# Patient Record
Sex: MALE | Race: WHITE | NOT HISPANIC OR LATINO
[De-identification: names, ages, dates, MRNs, and addresses within clinical notes are randomized per-mention and may not be internally consistent; named-entity substitution may affect disease eponyms.]

---

## 2017-10-19 ENCOUNTER — APPOINTMENT (OUTPATIENT)
Dept: UROLOGY | Facility: CLINIC | Age: 67
End: 2017-10-19
Payer: MEDICARE

## 2017-10-19 VITALS
HEART RATE: 97 BPM | SYSTOLIC BLOOD PRESSURE: 124 MMHG | DIASTOLIC BLOOD PRESSURE: 76 MMHG | HEIGHT: 70 IN | WEIGHT: 165 LBS | OXYGEN SATURATION: 98 % | BODY MASS INDEX: 23.62 KG/M2 | TEMPERATURE: 98.5 F

## 2017-10-19 DIAGNOSIS — Z86.39 PERSONAL HISTORY OF OTHER ENDOCRINE, NUTRITIONAL AND METABOLIC DISEASE: ICD-10-CM

## 2017-10-19 DIAGNOSIS — Z86.79 PERSONAL HISTORY OF OTHER DISEASES OF THE CIRCULATORY SYSTEM: ICD-10-CM

## 2017-10-19 DIAGNOSIS — Z87.891 PERSONAL HISTORY OF NICOTINE DEPENDENCE: ICD-10-CM

## 2017-10-19 DIAGNOSIS — Z87.19 PERSONAL HISTORY OF OTHER DISEASES OF THE DIGESTIVE SYSTEM: ICD-10-CM

## 2017-10-19 PROCEDURE — 99213 OFFICE O/P EST LOW 20 MIN: CPT

## 2017-10-19 RX ORDER — METOPROLOL SUCCINATE 50 MG/1
50 TABLET, EXTENDED RELEASE ORAL
Refills: 0 | Status: ACTIVE | COMMUNITY

## 2017-10-19 RX ORDER — TRAZODONE HCL 50 MG
50 TABLET ORAL
Refills: 0 | Status: ACTIVE | COMMUNITY

## 2017-10-19 RX ORDER — ROSUVASTATIN CALCIUM 5 MG/1
5 TABLET, FILM COATED ORAL
Refills: 0 | Status: ACTIVE | COMMUNITY

## 2017-10-19 RX ORDER — GABAPENTIN 100 MG/1
100 CAPSULE ORAL
Refills: 0 | Status: ACTIVE | COMMUNITY

## 2017-10-19 RX ORDER — EZETIMIBE 10 MG/1
10 TABLET ORAL
Refills: 0 | Status: ACTIVE | COMMUNITY

## 2017-10-19 RX ORDER — AMLODIPINE BESYLATE 10 MG/1
10 TABLET ORAL
Refills: 0 | Status: ACTIVE | COMMUNITY

## 2017-10-19 RX ORDER — OMEPRAZOLE MAGNESIUM 40 MG/1
40 CAPSULE, DELAYED RELEASE ORAL
Refills: 0 | Status: ACTIVE | COMMUNITY

## 2017-12-15 ENCOUNTER — APPOINTMENT (OUTPATIENT)
Dept: UROLOGY | Facility: CLINIC | Age: 67
End: 2017-12-15

## 2018-02-16 ENCOUNTER — APPOINTMENT (OUTPATIENT)
Dept: UROLOGY | Facility: CLINIC | Age: 68
End: 2018-02-16
Payer: MEDICARE

## 2018-02-16 VITALS
TEMPERATURE: 99 F | OXYGEN SATURATION: 99 % | HEART RATE: 115 BPM | RESPIRATION RATE: 16 BRPM | SYSTOLIC BLOOD PRESSURE: 142 MMHG | DIASTOLIC BLOOD PRESSURE: 84 MMHG

## 2018-02-16 PROCEDURE — 99213 OFFICE O/P EST LOW 20 MIN: CPT | Mod: 25

## 2018-02-16 PROCEDURE — 96402 CHEMO HORMON ANTINEOPL SQ/IM: CPT

## 2018-02-16 RX ORDER — LEUPROLIDE ACETATE 22.5 MG
22.5 KIT INTRAMUSCULAR
Qty: 1 | Refills: 0 | Status: COMPLETED | OUTPATIENT
Start: 2018-02-16 | End: 2018-02-16

## 2018-02-16 RX ORDER — LEUPROLIDE ACETATE 22.5 MG
22.5 KIT INTRAMUSCULAR
Qty: 1 | Refills: 0 | Status: COMPLETED | OUTPATIENT
Start: 2018-02-16

## 2018-02-16 RX ADMIN — LEUPROLIDE ACETATE 0 MG: KIT at 00:00

## 2018-05-18 ENCOUNTER — APPOINTMENT (OUTPATIENT)
Dept: UROLOGY | Facility: CLINIC | Age: 68
End: 2018-05-18
Payer: MEDICARE

## 2018-05-18 PROCEDURE — 99213 OFFICE O/P EST LOW 20 MIN: CPT | Mod: 25

## 2018-05-18 PROCEDURE — 96402 CHEMO HORMON ANTINEOPL SQ/IM: CPT

## 2018-05-18 RX ORDER — DENOSUMAB 60 MG/ML
60 INJECTION SUBCUTANEOUS
Qty: 1 | Refills: 0 | Status: ACTIVE | COMMUNITY
Start: 2018-05-07

## 2018-05-18 RX ORDER — TRAMADOL HYDROCHLORIDE 50 MG/1
50 TABLET, COATED ORAL
Qty: 40 | Refills: 0 | Status: ACTIVE | COMMUNITY
Start: 2017-12-27

## 2018-05-18 RX ORDER — DULOXETINE HYDROCHLORIDE 20 MG/1
20 CAPSULE, DELAYED RELEASE PELLETS ORAL
Qty: 60 | Refills: 0 | Status: ACTIVE | COMMUNITY
Start: 2017-12-27

## 2018-05-18 RX ORDER — METOPROLOL SUCCINATE 25 MG/1
25 TABLET, EXTENDED RELEASE ORAL
Qty: 90 | Refills: 0 | Status: ACTIVE | COMMUNITY
Start: 2018-05-14

## 2018-05-18 RX ORDER — CALCITRIOL 0.5 UG/1
0.5 CAPSULE, LIQUID FILLED ORAL
Qty: 90 | Refills: 0 | Status: ACTIVE | COMMUNITY
Start: 2017-11-22

## 2018-05-18 RX ORDER — FOLIC ACID 1 MG/1
1 TABLET ORAL
Qty: 90 | Refills: 0 | Status: ACTIVE | COMMUNITY
Start: 2017-12-11

## 2018-05-18 RX ORDER — METOPROLOL SUCCINATE 100 MG/1
100 TABLET, EXTENDED RELEASE ORAL
Qty: 90 | Refills: 0 | Status: ACTIVE | COMMUNITY
Start: 2018-04-26

## 2018-05-18 RX ORDER — LEUPROLIDE ACETATE 30 MG
30 KIT INTRAMUSCULAR
Qty: 1 | Refills: 0 | Status: COMPLETED | OUTPATIENT
Start: 2018-05-18

## 2018-05-18 RX ADMIN — LEUPROLIDE ACETATE 30 MG: KIT at 00:00

## 2018-09-07 ENCOUNTER — APPOINTMENT (OUTPATIENT)
Dept: UROLOGY | Facility: CLINIC | Age: 68
End: 2018-09-07
Payer: MEDICARE

## 2018-09-07 VITALS
HEIGHT: 70 IN | BODY MASS INDEX: 23.62 KG/M2 | SYSTOLIC BLOOD PRESSURE: 147 MMHG | HEART RATE: 101 BPM | TEMPERATURE: 98.8 F | DIASTOLIC BLOOD PRESSURE: 73 MMHG | WEIGHT: 165 LBS

## 2018-09-07 PROCEDURE — 99213 OFFICE O/P EST LOW 20 MIN: CPT | Mod: 25

## 2018-09-07 PROCEDURE — 96402 CHEMO HORMON ANTINEOPL SQ/IM: CPT

## 2018-09-07 RX ORDER — LEUPROLIDE ACETATE 30 MG
30 KIT INTRAMUSCULAR
Qty: 1 | Refills: 0 | Status: ACTIVE | COMMUNITY
Start: 2018-09-07

## 2018-09-07 RX ORDER — LEUPROLIDE ACETATE 30 MG
30 KIT INTRAMUSCULAR
Qty: 1 | Refills: 0 | Status: COMPLETED | OUTPATIENT
Start: 2018-09-07

## 2018-09-07 RX ADMIN — LEUPROLIDE ACETATE 0 MG: KIT at 00:00

## 2018-12-21 ENCOUNTER — APPOINTMENT (OUTPATIENT)
Dept: UROLOGY | Facility: CLINIC | Age: 68
End: 2018-12-21
Payer: MEDICARE

## 2018-12-21 VITALS
WEIGHT: 165 LBS | SYSTOLIC BLOOD PRESSURE: 136 MMHG | DIASTOLIC BLOOD PRESSURE: 76 MMHG | HEART RATE: 99 BPM | BODY MASS INDEX: 23.62 KG/M2 | HEIGHT: 70 IN

## 2018-12-21 PROCEDURE — 99214 OFFICE O/P EST MOD 30 MIN: CPT | Mod: 25

## 2018-12-21 PROCEDURE — 96402 CHEMO HORMON ANTINEOPL SQ/IM: CPT

## 2018-12-21 RX ORDER — AMOXICILLIN AND CLAVULANATE POTASSIUM 500; 125 MG/1; MG/1
500-125 TABLET, FILM COATED ORAL
Qty: 10 | Refills: 0 | Status: DISCONTINUED | COMMUNITY
Start: 2018-09-07 | End: 2018-12-21

## 2018-12-21 RX ORDER — LEUPROLIDE ACETATE 30 MG
30 KIT INTRAMUSCULAR
Qty: 1 | Refills: 0 | Status: COMPLETED | OUTPATIENT
Start: 2018-12-21

## 2018-12-21 RX ADMIN — LEUPROLIDE ACETATE 0 MG: KIT at 00:00

## 2019-01-04 ENCOUNTER — APPOINTMENT (OUTPATIENT)
Dept: UROLOGY | Facility: CLINIC | Age: 69
End: 2019-01-04

## 2019-04-12 ENCOUNTER — APPOINTMENT (OUTPATIENT)
Dept: UROLOGY | Facility: CLINIC | Age: 69
End: 2019-04-12
Payer: MEDICARE

## 2019-04-12 ENCOUNTER — APPOINTMENT (OUTPATIENT)
Dept: UROLOGY | Facility: CLINIC | Age: 69
End: 2019-04-12

## 2019-04-12 VITALS
SYSTOLIC BLOOD PRESSURE: 125 MMHG | HEART RATE: 97 BPM | RESPIRATION RATE: 15 BRPM | WEIGHT: 160 LBS | HEIGHT: 70 IN | BODY MASS INDEX: 22.9 KG/M2 | DIASTOLIC BLOOD PRESSURE: 77 MMHG

## 2019-04-12 PROCEDURE — 99213 OFFICE O/P EST LOW 20 MIN: CPT | Mod: 25

## 2019-04-12 PROCEDURE — 96402 CHEMO HORMON ANTINEOPL SQ/IM: CPT

## 2019-04-12 RX ORDER — LEUPROLIDE ACETATE 30 MG
30 KIT INTRAMUSCULAR
Qty: 1 | Refills: 0 | Status: COMPLETED | OUTPATIENT
Start: 2019-04-12

## 2019-04-12 RX ADMIN — LEUPROLIDE ACETATE 0 MG: KIT at 00:00

## 2019-04-12 NOTE — ASSESSMENT
[FreeTextEntry1] : He will continue the above treatment therapy for his bone health. Lupon 30 mg injection was given on the left side, lot number [id number], expiration date November 28, 2020. He will followup in 4 months.\par \par Ra Connolly MD, FACS\par [facility]\par  of Urology\par \par [address], [address]\[place] [address]\par \par [address] [facility], [address]\[place] [address]\par \par Tel: [phone]\par Fax: [phone]

## 2019-04-12 NOTE — HISTORY OF PRESENT ILLNESS
[FreeTextEntry1] : He is a 68-year-old man who is seen today in follow-up for large kidney stone, nocturia, prostate cancer and Lupron injections. Urinary symptoms are unchanged which is nocturia 2 or 3 times. He is still recovering from hip fracture and replacement. PSA level was undetectable in April 2019. He has continued observation of the large left kidney stone since he does not have any symptoms. There is no hematuria, dysuria or weight loss. He is on vitamin D and calcium and Prolia for osteoporosis. \par Previous note: He started Lupron injection in May 2016. He was treated with Cyberknife radiation therapy for prostate cancer around 2008. Bone scan in 2016 was negative and CT scan showed left renal 2.2 cm stone, right obturator adenopathy up to 1.8 cm, common iliac adenopathy and sclerotic pelvic bone lesions.

## 2019-04-12 NOTE — PHYSICAL EXAM
[General Appearance - Well Developed] : well developed [General Appearance - Well Nourished] : well nourished [Normal Appearance] : normal appearance [Well Groomed] : well groomed [General Appearance - In No Acute Distress] : no acute distress [Abdomen Tenderness] : non-tender [Abdomen Soft] : soft [] : no respiratory distress [Costovertebral Angle Tenderness] : no ~M costovertebral angle tenderness [Respiration, Rhythm And Depth] : normal respiratory rhythm and effort [Exaggerated Use Of Accessory Muscles For Inspiration] : no accessory muscle use

## 2019-04-12 NOTE — LETTER BODY
[Dear  ___] : Dear  [unfilled], [Thank you very much for allowing me to participate in the care of this patient. If you have any questions, please do not hesitate to contact me] : Thank you very much for allowing me to participate in the care of this patient. If you have any questions, please do not hesitate to contact me. [Attached please find my note.] : Attached please find my note. [FreeTextEntry1] : [address] \[place], [address] \par [phone] \par

## 2019-04-19 ENCOUNTER — APPOINTMENT (OUTPATIENT)
Dept: UROLOGY | Facility: CLINIC | Age: 69
End: 2019-04-19

## 2019-08-23 ENCOUNTER — APPOINTMENT (OUTPATIENT)
Dept: UROLOGY | Facility: CLINIC | Age: 69
End: 2019-08-23
Payer: MEDICARE

## 2019-08-23 PROCEDURE — 99213 OFFICE O/P EST LOW 20 MIN: CPT | Mod: 25

## 2019-08-23 PROCEDURE — 96402 CHEMO HORMON ANTINEOPL SQ/IM: CPT

## 2019-08-23 RX ORDER — LEUPROLIDE ACETATE 30 MG
30 KIT INTRAMUSCULAR
Qty: 1 | Refills: 0 | Status: COMPLETED | COMMUNITY
Start: 2019-08-23 | End: 2019-08-23

## 2019-08-23 RX ORDER — LEUPROLIDE ACETATE 30 MG
30 KIT INTRAMUSCULAR
Refills: 0 | Status: COMPLETED | OUTPATIENT
Start: 2019-08-23

## 2019-08-23 RX ADMIN — LEUPROLIDE ACETATE 0 MG: KIT at 00:00

## 2019-08-23 NOTE — ASSESSMENT
[FreeTextEntry1] : Lupron 30 mg injection was given on the right side, expiration date September 26, 2021, lot number [id number]. He tolerated it well. Continue the current medication therapy and follow up in 4 months. He will have a repeat PSA level done at [facility].\par \par Ra Connolly MD, FACS\par [facility]\par  of Urology\par \par [address], [address]\[place] [address]\par \par [address], [address]\[place] [address]\par \par Tel: [phone]\par Fax: [phone]

## 2019-08-23 NOTE — PHYSICAL EXAM
[General Appearance - Well Developed] : well developed [General Appearance - Well Nourished] : well nourished [Normal Appearance] : normal appearance [Well Groomed] : well groomed [General Appearance - In No Acute Distress] : no acute distress [Abdomen Soft] : soft [Abdomen Tenderness] : non-tender [Costovertebral Angle Tenderness] : no ~M costovertebral angle tenderness [] : no respiratory distress [Exaggerated Use Of Accessory Muscles For Inspiration] : no accessory muscle use [Respiration, Rhythm And Depth] : normal respiratory rhythm and effort [FreeTextEntry1] : using a cane.

## 2019-08-23 NOTE — LETTER BODY
[Dear  ___] : Dear  [unfilled], [Attached please find my note.] : Attached please find my note. [Thank you very much for allowing me to participate in the care of this patient. If you have any questions, please do not hesitate to contact me] : Thank you very much for allowing me to participate in the care of this patient. If you have any questions, please do not hesitate to contact me. [FreeTextEntry1] : [address] \[place], [address] \par [phone] \par

## 2019-08-23 NOTE — HISTORY OF PRESENT ILLNESS
[FreeTextEntry1] : He is a 69-year-old man who is seen today in follow-up for kidney stone, prostate cancer and Lupron injections. He is not bothered by urination which is about 2 or 3 times a night. There is no hematuria or dysuria. His last PSA level in April 2019 was undetectable. He does not have any flank pain. There is no hematuria, dysuria or weight loss. He is on vitamin D and calcium and Prolia for osteoporosis. \par Previous note: He started Lupron injection in May 2016. He was treated with Cyberknife radiation therapy for prostate cancer around 2008. Bone scan in 2016 was negative and CT scan showed left renal 2.2 cm stone, right obturator adenopathy up to 1.8 cm, common iliac adenopathy and sclerotic pelvic bone lesions.

## 2019-12-27 ENCOUNTER — APPOINTMENT (OUTPATIENT)
Dept: UROLOGY | Facility: CLINIC | Age: 69
End: 2019-12-27
Payer: MEDICARE

## 2019-12-27 VITALS — DIASTOLIC BLOOD PRESSURE: 76 MMHG | HEART RATE: 106 BPM | SYSTOLIC BLOOD PRESSURE: 138 MMHG

## 2019-12-27 PROCEDURE — 96402 CHEMO HORMON ANTINEOPL SQ/IM: CPT

## 2019-12-27 PROCEDURE — 99213 OFFICE O/P EST LOW 20 MIN: CPT | Mod: 25

## 2019-12-27 RX ORDER — LEUPROLIDE ACETATE 30 MG
30 KIT INTRAMUSCULAR
Qty: 1 | Refills: 0 | Status: COMPLETED | OUTPATIENT
Start: 2019-12-27

## 2019-12-27 RX ADMIN — LEUPROLIDE ACETATE 0 MG: KIT at 00:00

## 2019-12-27 NOTE — PHYSICAL EXAM
[General Appearance - Well Developed] : well developed [General Appearance - Well Nourished] : well nourished [Normal Appearance] : normal appearance [Well Groomed] : well groomed [General Appearance - In No Acute Distress] : no acute distress [Abdomen Soft] : soft [Abdomen Tenderness] : non-tender [Costovertebral Angle Tenderness] : no ~M costovertebral angle tenderness [] : no respiratory distress [Respiration, Rhythm And Depth] : normal respiratory rhythm and effort [Exaggerated Use Of Accessory Muscles For Inspiration] : no accessory muscle use [Normal Station and Gait] : the gait and station were normal for the patient's age

## 2019-12-27 NOTE — ASSESSMENT
[FreeTextEntry1] : He is due for PSA level which he will go back to his primary care physician to do. Lupron 30 mg injection was given on the left side, lot number [id number], expiration date February 18, 2020. He'll follow up in 4 months for next Lupron.\par \par Ra Connolly MD, FACS\par [facility]\par  of Urology\par \par [address], [address]\[place] [address]\par \par [address] [facility], [address]\[place] [address]\par \par Tel: [phone]\par Fax: [phone]

## 2019-12-27 NOTE — HISTORY OF PRESENT ILLNESS
[FreeTextEntry1] : He is a 69-year-old man who is seen today in follow-up for kidney stone, prostate cancer and Lupron injections. He does not have any new symptoms, nocturia is 2 times. There is no flank pain. His last PSA level was in April 2019 which was less than 0.1. He is receiving Lupron injection today. There is no hematuria, dysuria or weight loss. He is on vitamin D and calcium and Prolia for osteoporosis. \par \par Previous note: He started Lupron injection in May 2016. He was treated with Cyberknife radiation therapy for prostate cancer around 2008. Bone scan in 2016 was negative and CT scan showed left renal 2.2 cm stone, right obturator adenopathy up to 1.8 cm, common iliac adenopathy and sclerotic pelvic bone lesions.

## 2019-12-27 NOTE — LETTER BODY
[Dear  ___] : Dear  [unfilled], [Thank you very much for allowing me to participate in the care of this patient. If you have any questions, please do not hesitate to contact me] : Thank you very much for allowing me to participate in the care of this patient. If you have any questions, please do not hesitate to contact me. [Attached please find my note.] : Attached please find my note. [FreeTextEntry1] : [address] \[place], [address] \par [phone] \par

## 2020-05-08 ENCOUNTER — APPOINTMENT (OUTPATIENT)
Dept: UROLOGY | Facility: CLINIC | Age: 70
End: 2020-05-08

## 2020-05-15 ENCOUNTER — APPOINTMENT (OUTPATIENT)
Dept: UROLOGY | Facility: CLINIC | Age: 70
End: 2020-05-15
Payer: MEDICARE

## 2020-05-15 VITALS — RESPIRATION RATE: 14 BRPM | HEIGHT: 70 IN | TEMPERATURE: 99.6 F | WEIGHT: 160 LBS | BODY MASS INDEX: 22.9 KG/M2

## 2020-05-15 PROCEDURE — 96402 CHEMO HORMON ANTINEOPL SQ/IM: CPT

## 2020-05-15 PROCEDURE — 99213 OFFICE O/P EST LOW 20 MIN: CPT | Mod: 25

## 2020-05-15 RX ORDER — LEUPROLIDE ACETATE 30 MG
30 KIT INTRAMUSCULAR
Qty: 1 | Refills: 0 | Status: COMPLETED | OUTPATIENT
Start: 2020-05-15

## 2020-05-15 RX ADMIN — LEUPROLIDE ACETATE 0 MG: KIT at 00:00

## 2020-05-15 NOTE — ASSESSMENT
[FreeTextEntry1] : He is not symptomatic with kidney stone and has decided to continue monitoring. Nocturia does not bother him. PSA level will be done soon. Lupron 30 mg injection was given on the left side, lot number [id number], expiration date May 8, 2022. Followup in 4 months.\par \par Ra Connolly MD, FACS\par [facility]\par  of Urology\par \par [address], [address]\[place] [address]\par \par [address] [address] [facility], [address]\[place] [address]\par \par Tel: [phone]\par Fax: [phone]

## 2020-05-15 NOTE — PHYSICAL EXAM
[General Appearance - Well Developed] : well developed [Normal Appearance] : normal appearance [Well Groomed] : well groomed [General Appearance - Well Nourished] : well nourished [General Appearance - In No Acute Distress] : no acute distress [Abdomen Soft] : soft [Costovertebral Angle Tenderness] : no ~M costovertebral angle tenderness [] : no respiratory distress [Abdomen Tenderness] : non-tender [Exaggerated Use Of Accessory Muscles For Inspiration] : no accessory muscle use [Oriented To Time, Place, And Person] : oriented to person, place, and time [Respiration, Rhythm And Depth] : normal respiratory rhythm and effort [Not Anxious] : not anxious [Mood] : the mood was normal [Affect] : the affect was normal

## 2020-05-15 NOTE — HISTORY OF PRESENT ILLNESS
[FreeTextEntry1] : He is a 69-year-old man who is seen today in follow-up for kidney stone, prostate cancer and Lupron injections. He usually does not complain of any significant urinary symptoms. Nocturia is 2 times and it does not bother him. There is no hematuria, dysuria or flank pain. He is supposed to have a repeat PSA level done next week. He is receiving four-month Lupron injection today. Last PSA level was in April 2019 which was less than 0.1. He is on vitamin D and calcium and Prolia for osteoporosis. \par \par Previous note: He started Lupron injection in May 2016. He was treated with Cyberknife radiation therapy for prostate cancer around 2008. Bone scan in 2016 was negative and CT scan showed left renal 2.2 cm stone, right obturator adenopathy up to 1.8 cm, common iliac adenopathy and sclerotic pelvic bone lesions.

## 2020-05-15 NOTE — LETTER BODY
[Dear  ___] : Dear  [unfilled], [Attached please find my note.] : Attached please find my note. [Thank you very much for allowing me to participate in the care of this patient. If you have any questions, please do not hesitate to contact me] : Thank you very much for allowing me to participate in the care of this patient. If you have any questions, please do not hesitate to contact me. [FreeTextEntry1] : [address] \[place], [address] \par [phone] \par

## 2020-09-11 ENCOUNTER — APPOINTMENT (OUTPATIENT)
Dept: UROLOGY | Facility: CLINIC | Age: 70
End: 2020-09-11
Payer: MEDICARE

## 2020-09-11 VITALS
HEART RATE: 102 BPM | BODY MASS INDEX: 24.05 KG/M2 | HEIGHT: 70 IN | RESPIRATION RATE: 16 BRPM | WEIGHT: 168 LBS | DIASTOLIC BLOOD PRESSURE: 66 MMHG | TEMPERATURE: 97.9 F | SYSTOLIC BLOOD PRESSURE: 109 MMHG

## 2020-09-11 PROCEDURE — 96402 CHEMO HORMON ANTINEOPL SQ/IM: CPT

## 2020-09-11 PROCEDURE — 99213 OFFICE O/P EST LOW 20 MIN: CPT | Mod: 25

## 2020-09-11 RX ORDER — LEUPROLIDE ACETATE 30 MG
30 KIT INTRAMUSCULAR
Qty: 1 | Refills: 0 | Status: COMPLETED | OUTPATIENT
Start: 2020-09-11

## 2020-09-11 RX ADMIN — LEUPROLIDE ACETATE 30 MG: KIT at 00:00

## 2020-09-11 NOTE — LETTER BODY
[Dear  ___] : Dear  [unfilled], [Thank you very much for allowing me to participate in the care of this patient. If you have any questions, please do not hesitate to contact me] : Thank you very much for allowing me to participate in the care of this patient. If you have any questions, please do not hesitate to contact me. [Attached please find my note.] : Attached please find my note. [FreeTextEntry1] : [address] \[place], [address] \par [phone] \par

## 2020-09-11 NOTE — PHYSICAL EXAM
[General Appearance - Well Developed] : well developed [Normal Appearance] : normal appearance [General Appearance - Well Nourished] : well nourished [Well Groomed] : well groomed [General Appearance - In No Acute Distress] : no acute distress [Abdomen Tenderness] : non-tender [Abdomen Soft] : soft [] : no respiratory distress [Costovertebral Angle Tenderness] : no ~M costovertebral angle tenderness [Respiration, Rhythm And Depth] : normal respiratory rhythm and effort [Exaggerated Use Of Accessory Muscles For Inspiration] : no accessory muscle use [Oriented To Time, Place, And Person] : oriented to person, place, and time [Mood] : the mood was normal [Affect] : the affect was normal [Not Anxious] : not anxious

## 2020-09-11 NOTE — HISTORY OF PRESENT ILLNESS
[FreeTextEntry1] : He is a 70-year-old man who is seen today in follow-up for kidney stone, prostate cancer and Lupron injections. He has no weight loss or bone pain. Urinary symptoms are stable with nocturia twice. His PSA level was less than 0.1 in June 2020. There is no hematuria, dysuria or flank pain. He is receiving 4-month Lupron injection today. He is on vitamin D and calcium and Prolia for osteoporosis. \par \par Previous note: He started Lupron injection in May 2016. He was treated with Cyberknife radiation therapy for prostate cancer around 2008. Bone scan in 2016 was negative and CT scan showed left renal 2.2 cm stone, right obturator adenopathy up to 1.8 cm, common iliac adenopathy and sclerotic pelvic bone lesions.

## 2020-09-11 NOTE — ASSESSMENT
[FreeTextEntry1] : He is not bothered by kidney stones or urinary symptoms. PSA level was undetectable. Lupron 30 mg injection was given on the right side, left number [id number], expiration date May 8, 2022. Followup in 4 months for the next injection.\par \par Ra Connolly MD, FACS\par [facility]\par  of Urology\par \par [address], [address]\[place] [address]\par \par [address] [facility] [address]\[place] [address]\par \par Tel: [phone]\par Fax: [phone]

## 2021-01-01 ENCOUNTER — APPOINTMENT (OUTPATIENT)
Dept: UROLOGY | Facility: CLINIC | Age: 71
End: 2021-01-01
Payer: MEDICARE

## 2021-01-01 VITALS — SYSTOLIC BLOOD PRESSURE: 120 MMHG | DIASTOLIC BLOOD PRESSURE: 71 MMHG | HEART RATE: 109 BPM

## 2021-01-01 PROCEDURE — 96402 CHEMO HORMON ANTINEOPL SQ/IM: CPT

## 2021-01-01 PROCEDURE — 99213 OFFICE O/P EST LOW 20 MIN: CPT | Mod: 25

## 2021-01-01 RX ORDER — LEUPROLIDE ACETATE 30 MG/.5ML
30 INJECTION, SUSPENSION, EXTENDED RELEASE SUBCUTANEOUS
Qty: 1 | Refills: 0 | Status: ACTIVE | COMMUNITY
Start: 2021-01-01

## 2021-01-01 RX ORDER — LEUPROLIDE ACETATE 30 MG/.5ML
30 INJECTION, SUSPENSION, EXTENDED RELEASE SUBCUTANEOUS
Refills: 0 | Status: COMPLETED | OUTPATIENT
Start: 2021-01-01

## 2021-01-01 RX ADMIN — Medication 0 MG: at 00:00

## 2021-01-22 ENCOUNTER — APPOINTMENT (OUTPATIENT)
Dept: UROLOGY | Facility: CLINIC | Age: 71
End: 2021-01-22
Payer: MEDICARE

## 2021-01-22 VITALS
HEART RATE: 112 BPM | SYSTOLIC BLOOD PRESSURE: 103 MMHG | DIASTOLIC BLOOD PRESSURE: 69 MMHG | HEIGHT: 70 IN | RESPIRATION RATE: 15 BRPM | BODY MASS INDEX: 23.62 KG/M2 | WEIGHT: 165 LBS | TEMPERATURE: 96.4 F

## 2021-01-22 PROCEDURE — 99213 OFFICE O/P EST LOW 20 MIN: CPT | Mod: 25

## 2021-01-22 PROCEDURE — 96402 CHEMO HORMON ANTINEOPL SQ/IM: CPT

## 2021-01-22 RX ORDER — LEUPROLIDE ACETATE 30 MG/.5ML
30 INJECTION, SUSPENSION, EXTENDED RELEASE SUBCUTANEOUS
Refills: 0 | Status: COMPLETED | OUTPATIENT
Start: 2021-01-22

## 2021-01-22 RX ADMIN — LEUPROLIDE ACETATE 0 MG: KIT SUBCUTANEOUS at 00:00

## 2021-01-22 NOTE — PHYSICAL EXAM
[General Appearance - Well Developed] : well developed [General Appearance - Well Nourished] : well nourished [Normal Appearance] : normal appearance [Well Groomed] : well groomed [General Appearance - In No Acute Distress] : no acute distress [Abdomen Tenderness] : non-tender [Abdomen Soft] : soft [Costovertebral Angle Tenderness] : no ~M costovertebral angle tenderness [] : no respiratory distress [Respiration, Rhythm And Depth] : normal respiratory rhythm and effort [Exaggerated Use Of Accessory Muscles For Inspiration] : no accessory muscle use [Oriented To Time, Place, And Person] : oriented to person, place, and time [Affect] : the affect was normal [Mood] : the mood was normal [Not Anxious] : not anxious

## 2021-01-22 NOTE — ASSESSMENT
[FreeTextEntry1] : PSA level was undetectable in June 2020.  He should have another PSA level done soon.  Eligard 30 mg injection was given on the right side.  Continue vitamin D and calcium.  He has decided on continued observation of kidney stone.  Also he has held off on further imaging studies for metastatic work-up since PSA levels have been undetectable.  He will follow-up in 4 months.\par \par Ra Connolly MD, FACS\par [facility]\par  of Urology\par \par [address], [address]\[place] [address]\par \par [address] [facility], [address]\[place] [address]\par \par Tel: [phone]\par Fax: [phone]

## 2021-01-22 NOTE — LETTER BODY
[Dear  ___] : Dear  [unfilled], [Attached please find my note.] : Attached please find my note. [Thank you very much for allowing me to participate in the care of this patient. If you have any questions, please do not hesitate to contact me] : Thank you very much for allowing me to participate in the care of this patient. If you have any questions, please do not hesitate to contact me. [FreeTextEntry1] : [address] \[place], [address] \par [phone] \par

## 2021-01-22 NOTE — HISTORY OF PRESENT ILLNESS
[FreeTextEntry1] : He is a 70-year-old man who is seen today in follow-up for kidney stone, prostate cancer and Lupron injections.  Urinary symptoms remained unchanged, nocturia is 2 or 3 times.  There is no weight loss or bone pain.  He is receiving Lupron injection today.  PSA level again was less than 0.1 in June 2020.  There is no flank pain.  He is on vitamin D and calcium and Prolia for osteoporosis. \par \par Previous note: He started Lupron injection in May 2016. He was treated with Cyberknife radiation therapy for prostate cancer around 2008. Bone scan in 2016 was negative and CT scan showed left renal 2.2 cm stone, right obturator adenopathy up to 1.8 cm, common iliac adenopathy and sclerotic pelvic bone lesions.

## 2021-06-11 ENCOUNTER — APPOINTMENT (OUTPATIENT)
Dept: UROLOGY | Facility: CLINIC | Age: 71
End: 2021-06-11
Payer: MEDICARE

## 2021-06-11 ENCOUNTER — APPOINTMENT (OUTPATIENT)
Dept: UROLOGY | Facility: CLINIC | Age: 71
End: 2021-06-11

## 2021-06-11 VITALS — SYSTOLIC BLOOD PRESSURE: 100 MMHG | TEMPERATURE: 98.5 F | DIASTOLIC BLOOD PRESSURE: 62 MMHG | HEART RATE: 89 BPM

## 2021-06-11 DIAGNOSIS — R35.1 NOCTURIA: ICD-10-CM

## 2021-06-11 PROCEDURE — 96402 CHEMO HORMON ANTINEOPL SQ/IM: CPT

## 2021-06-11 PROCEDURE — 99213 OFFICE O/P EST LOW 20 MIN: CPT | Mod: 25

## 2021-06-11 RX ORDER — LEUPROLIDE ACETATE 30 MG/.5ML
30 INJECTION, SUSPENSION, EXTENDED RELEASE SUBCUTANEOUS
Refills: 0 | Status: COMPLETED | OUTPATIENT
Start: 2021-06-11

## 2021-06-11 RX ADMIN — LEUPROLIDE ACETATE 0 MG: KIT SUBCUTANEOUS at 00:00

## 2021-06-11 NOTE — ASSESSMENT
[FreeTextEntry1] : Eligard 30 mg injection was given on the right side.  Continue vitamin D, calcium and Prolia.  PSA level has remained very low.  He can follow-up in 4 months for the next injection.\par \par Ra Connolly MD, FACS\par [facility]\par  of Urology\par \par [address]\[place] [address]\par \par [address] [facility] [facility], [address]\[place] [address]\par \par Tel: [phone]\par Fax: [phone]

## 2021-06-11 NOTE — HISTORY OF PRESENT ILLNESS
[FreeTextEntry1] : He is a 70-year-old man who is seen today in follow-up for kidney stone, prostate cancer on leuprolide injections.  He is voiding well.  Nocturia is 2 times.  He has no hematuria or dysuria.  PSA level was less than 0.1 in February 2021 which has remained stable for a long time.  He is receiving Eligard injection today.  He is on vitamin D and calcium.  He receives Prolia for osteoporosis, according to the patient.\par \par Previous note: He started Lupron injection in May 2016. He was treated with Cyberknife radiation therapy for prostate cancer around 2008. Bone scan in 2016 was negative and CT scan showed left renal 2.2 cm stone, right obturator adenopathy up to 1.8 cm, common iliac adenopathy and sclerotic pelvic bone lesions.

## 2021-06-11 NOTE — LETTER BODY
[Dear  ___] : Dear  [unfilled], [Attached please find my note.] : Attached please find my note. [Thank you very much for allowing me to participate in the care of this patient. If you have any questions, please do not hesitate to contact me] : Thank you very much for allowing me to participate in the care of this patient. If you have any questions, please do not hesitate to contact me. [FreeTextEntry1] : [address] \[place], [address] \par [phone] \par

## 2021-10-22 NOTE — HISTORY OF PRESENT ILLNESS
[FreeTextEntry1] : He is a 71-year-old man who is seen today in follow-up for kidney stone and prostate cancer on leuprolide injections. He does not have any new complaints. He is voiding well. There is no weight loss or bone pain. PSA level was undetectable in September 2021. There is no flank pain. Nocturia is usually 1 or 2 times. He is using vitamin D and calcium. He receives Prolia for osteoporosis, according to the patient.\par \par Previous note: He started Lupron injection in May 2016. He was treated with Cyberknife radiation therapy for prostate cancer around 2008. Bone scan in 2016 was negative and CT scan showed left renal 2.2 cm stone, right obturator adenopathy up to 1.8 cm, common iliac adenopathy and sclerotic pelvic bone lesions.

## 2021-10-22 NOTE — LETTER BODY
[Dear  ___] : Dear  [unfilled], [Attached please find my note.] : Attached please find my note. [Thank you very much for allowing me to participate in the care of this patient. If you have any questions, please do not hesitate to contact me] : Thank you very much for allowing me to participate in the care of this patient. If you have any questions, please do not hesitate to contact me. [FreeTextEntry1] : [address] \[place], [address] \par [phone] \par

## 2021-10-22 NOTE — ASSESSMENT
[FreeTextEntry1] : 4-month Eligard injection was given on the left side. He is voiding well. PSA has been undetectable. He is not symptomatic with renal stone. Continue Vit D and calcium. He will follow in 4 months.\par \par Ra Connolly MD, FACS\par [facility]\par  of Urology\par [address] [place] [address], [address]\[place] [address]\par Tel: [phone]\par Fax: [phone]\par

## 2022-01-01 ENCOUNTER — NON-APPOINTMENT (OUTPATIENT)
Age: 72
End: 2022-01-01

## 2022-01-01 ENCOUNTER — APPOINTMENT (OUTPATIENT)
Dept: UROLOGY | Facility: CLINIC | Age: 72
End: 2022-01-01

## 2022-01-01 ENCOUNTER — APPOINTMENT (OUTPATIENT)
Dept: UROLOGY | Facility: CLINIC | Age: 72
End: 2022-01-01
Payer: MEDICARE

## 2022-01-01 VITALS — SYSTOLIC BLOOD PRESSURE: 100 MMHG | HEART RATE: 121 BPM | DIASTOLIC BLOOD PRESSURE: 67 MMHG

## 2022-01-01 VITALS
HEIGHT: 70 IN | WEIGHT: 165 LBS | BODY MASS INDEX: 23.62 KG/M2 | DIASTOLIC BLOOD PRESSURE: 57 MMHG | SYSTOLIC BLOOD PRESSURE: 83 MMHG | HEART RATE: 109 BPM

## 2022-01-01 DIAGNOSIS — C61 MALIGNANT NEOPLASM OF PROSTATE: ICD-10-CM

## 2022-01-01 DIAGNOSIS — N20.0 CALCULUS OF KIDNEY: ICD-10-CM

## 2022-01-01 PROCEDURE — 99213 OFFICE O/P EST LOW 20 MIN: CPT | Mod: 25

## 2022-01-01 PROCEDURE — 96402 CHEMO HORMON ANTINEOPL SQ/IM: CPT

## 2022-01-01 RX ORDER — LEUPROLIDE ACETATE 30 MG/.5ML
30 INJECTION, SUSPENSION, EXTENDED RELEASE SUBCUTANEOUS
Refills: 0 | Status: COMPLETED | OUTPATIENT
Start: 2022-01-01

## 2022-01-01 RX ADMIN — LEUPROLIDE ACETATE 1 MG: KIT SUBCUTANEOUS at 00:00

## 2022-01-01 RX ADMIN — LEUPROLIDE ACETATE 0 MG: KIT SUBCUTANEOUS at 00:00

## 2022-02-25 NOTE — ASSESSMENT
[FreeTextEntry1] : He will continue with vitamin D and calcium.  Eligard 30 mg injection was given on the right side.  Side effects reviewed.  He is due for PSA level.  Since levels have remained undetectable, he has proceeded with no further imaging studies for metastatic work-up at this time.  Also since he is asymptomatic with kidney stone he has continued observation for now.  He will follow up in 4 months for the next injection.\par \par Ra Connolly MD, FACS\par [facility]\par  of Urology\par \par [address], [address]\[place] [address]\par \par [address] [facility] [facility], [address]\[place] [address]\par \par Tel: [phone]\par Fax: [phone]

## 2022-02-25 NOTE — LETTER BODY
[Dear  ___] : Dear  [unfilled], [Attached please find my note.] : Attached please find my note. [Thank you very much for allowing me to participate in the care of this patient. If you have any questions, please do not hesitate to contact me] : Thank you very much for allowing me to participate in the care of this patient. If you have any questions, please do not hesitate to contact me. [FreeTextEntry1] : [facility]\par [address] \[place], [address] \par [phone] \par

## 2022-02-25 NOTE — HISTORY OF PRESENT ILLNESS
[FreeTextEntry1] : He is a 71-year-old man who is seen today in follow-up for history of kidney stone and prostate cancer on leuprolide injections.  He is satisfied with urination.  Urinary flow is normal.  He does not complain of bone pain or weight loss.  His most recent PSA level was in September 2021 which has remained undetectable.  He is due for repeat PSA level.  Patient states that he has blood work coming up.  He does not complain of flank pain and so far has continued watching his kidney stone.  He continues with vitamin D and calcium. He receives Prolia for osteoporosis, according to the patient.\par \par Previous note: He started Lupron injection in May 2016. He was treated with Cyberknife radiation therapy for prostate cancer around 2008. Bone scan in 2016 was negative and CT scan showed left renal 2.2 cm stone, right obturator adenopathy up to 1.8 cm, common iliac adenopathy and sclerotic pelvic bone lesions.

## 2022-08-12 PROBLEM — N20.0 KIDNEY STONE ON LEFT SIDE: Status: ACTIVE | Noted: 2018-12-21

## 2022-08-12 PROBLEM — C61 MALIGNANT NEOPLASM OF PROSTATE: Status: ACTIVE | Noted: 2017-10-19

## 2022-08-12 NOTE — PHYSICAL EXAM
[General Appearance - Well Developed] : well developed [General Appearance - Well Nourished] : well nourished [Normal Appearance] : normal appearance [Well Groomed] : well groomed [General Appearance - In No Acute Distress] : no acute distress [Abdomen Soft] : soft [Abdomen Tenderness] : non-tender [Costovertebral Angle Tenderness] : no ~M costovertebral angle tenderness [] : no respiratory distress [Respiration, Rhythm And Depth] : normal respiratory rhythm and effort [Exaggerated Use Of Accessory Muscles For Inspiration] : no accessory muscle use [FreeTextEntry1] : In wheelchair

## 2022-08-12 NOTE — LETTER BODY
[Dear  ___] : Dear  [unfilled], [Attached please find my note.] : Attached please find my note. [Thank you very much for allowing me to participate in the care of this patient. If you have any questions, please do not hesitate to contact me] : Thank you very much for allowing me to participate in the care of this patient. If you have any questions, please do not hesitate to contact me. [FreeTextEntry1] : [facility]\par [address] \[place], [address] \par [phone] \par

## 2022-08-12 NOTE — ASSESSMENT
[FreeTextEntry1] : Eligard 30 mg, 4 months injection was given in the right lower abdomen.  He is undergoing rehabilitation for his tibia and fibula fracture at this time.  He is unable to stand at this moment.  Otherwise he is voiding well.  He will follow-up in 4 months for the next injection.\par \par Ra Connolly MD, FACS\par [facility]\par  of Urology\par \par [address], [address]\[place] [address]\par \par [address] [facility], [address]\[place] [address]\par \par Tel: [phone]\par Fax: [phone]

## 2022-08-12 NOTE — HISTORY OF PRESENT ILLNESS
[FreeTextEntry1] : He is a 72-year-old man who is seen today in follow-up for history of kidney stone and prostate cancer on leuprolide injections.  Since last visit he broke his right tibia and fibula.  He is in wheelchair now.  He is receiving Eligard injection today.  Generally he does not have significant voiding symptoms.  PSA level in September 2021 and April 2022 were undetectable.\par He does not complain of flank pain and so far has continued watching his kidney stone.  He continues with vitamin D and calcium. He receives Prolia for osteoporosis, according to the patient.\par \par Previous note: He started Lupron injection in May 2016. He was treated with Cyberknife radiation therapy for prostate cancer around 2008. Bone scan in 2016 was negative and CT scan showed left renal 2.2 cm stone, right obturator adenopathy up to 1.8 cm, common iliac adenopathy and sclerotic pelvic bone lesions.

## 2022-12-09 ENCOUNTER — APPOINTMENT (OUTPATIENT)
Dept: UROLOGY | Facility: CLINIC | Age: 72
End: 2022-12-09